# Patient Record
Sex: MALE | Race: WHITE
[De-identification: names, ages, dates, MRNs, and addresses within clinical notes are randomized per-mention and may not be internally consistent; named-entity substitution may affect disease eponyms.]

---

## 2018-10-22 ENCOUNTER — TRANSCRIPTION ENCOUNTER (OUTPATIENT)
Age: 63
End: 2018-10-22

## 2018-11-01 ENCOUNTER — TRANSCRIPTION ENCOUNTER (OUTPATIENT)
Age: 63
End: 2018-11-01

## 2019-02-07 ENCOUNTER — APPOINTMENT (OUTPATIENT)
Dept: OTOLARYNGOLOGY | Facility: CLINIC | Age: 64
End: 2019-02-07
Payer: COMMERCIAL

## 2019-02-07 VITALS
HEART RATE: 88 BPM | HEIGHT: 66 IN | SYSTOLIC BLOOD PRESSURE: 140 MMHG | BODY MASS INDEX: 21.05 KG/M2 | DIASTOLIC BLOOD PRESSURE: 96 MMHG | WEIGHT: 131 LBS

## 2019-02-07 PROCEDURE — G0268 REMOVAL OF IMPACTED WAX MD: CPT

## 2019-02-07 PROCEDURE — 92567 TYMPANOMETRY: CPT

## 2019-02-07 PROCEDURE — 92557 COMPREHENSIVE HEARING TEST: CPT

## 2019-02-07 PROCEDURE — 99204 OFFICE O/P NEW MOD 45 MIN: CPT | Mod: 25

## 2019-02-07 RX ORDER — CEFUROXIME AXETIL 500 MG/1
500 TABLET ORAL
Qty: 14 | Refills: 0 | Status: ACTIVE | COMMUNITY
Start: 2018-11-01

## 2019-02-07 RX ORDER — VEDOLIZUMAB 300 MG/5ML
300 INJECTION, POWDER, LYOPHILIZED, FOR SOLUTION INTRAVENOUS
Refills: 0 | Status: ACTIVE | COMMUNITY

## 2019-02-07 NOTE — HISTORY OF PRESENT ILLNESS
[de-identified] : Ptient has been having some itching in both ears that is minor and occasional. he has some ringing in both ears as well that comes and goes on occasion but does not keep him awake at night. He thinks the right ear is worse than the left ear. He is not having any nasal congestion or runny nose. His wife tells him that he does not hear well but hes not sure if he has hearing loss

## 2019-02-07 NOTE — ASSESSMENT
[FreeTextEntry1] : Patient follows up diminished hearing mass of cerumen impaction right ear a very narrow ear canal left ear had a moderate amount curetted out audiogram performed confirmed symmetrical bilateral high-frequency hearing loss relatively normal hearing in his speech frequencies I recommend he followup on an annual basis.

## 2019-02-07 NOTE — CONSULT LETTER
[Dear  ___] : Dear  [unfilled], [Consult Letter:] : I had the pleasure of evaluating your patient, [unfilled]. [Please see my note below.] : Please see my note below. [Consult Closing:] : Thank you very much for allowing me to participate in the care of this patient.  If you have any questions, please do not hesitate to contact me. [Sincerely,] : Sincerely, [FreeTextEntry3] : Kirill Champion MD\par [facility]\par Otolaryngology and Facial Plastics\par [employer], [employer]\par

## 2020-01-08 ENCOUNTER — TRANSCRIPTION ENCOUNTER (OUTPATIENT)
Age: 65
End: 2020-01-08

## 2020-09-01 ENCOUNTER — APPOINTMENT (OUTPATIENT)
Dept: OTOLARYNGOLOGY | Facility: CLINIC | Age: 65
End: 2020-09-01

## 2020-11-02 ENCOUNTER — APPOINTMENT (OUTPATIENT)
Dept: OTOLARYNGOLOGY | Facility: CLINIC | Age: 65
End: 2020-11-02
Payer: MEDICARE

## 2020-11-02 VITALS
HEART RATE: 80 BPM | BODY MASS INDEX: 21.05 KG/M2 | DIASTOLIC BLOOD PRESSURE: 94 MMHG | HEIGHT: 66 IN | SYSTOLIC BLOOD PRESSURE: 145 MMHG | TEMPERATURE: 97.3 F | WEIGHT: 131 LBS

## 2020-11-02 DIAGNOSIS — H61.23 IMPACTED CERUMEN, BILATERAL: ICD-10-CM

## 2020-11-02 DIAGNOSIS — H90.3 SENSORINEURAL HEARING LOSS, BILATERAL: ICD-10-CM

## 2020-11-02 PROCEDURE — 99214 OFFICE O/P EST MOD 30 MIN: CPT | Mod: 25

## 2020-11-02 PROCEDURE — 31231 NASAL ENDOSCOPY DX: CPT

## 2020-11-02 PROCEDURE — 99072 ADDL SUPL MATRL&STAF TM PHE: CPT

## 2020-11-02 NOTE — ASSESSMENT
[FreeTextEntry1] : Patient for cerumen checkup no wax either way he does have a lot of postnasal drip endoscopically severe deviated septum on his left side he will use some Flonase to see if that will help him otherwise no further acute interventions indicated and he will follow-up with us annually.

## 2020-11-02 NOTE — HISTORY OF PRESENT ILLNESS
[de-identified] : PAtient states that he has had minor clogging\par He denies changes in hearing but still has baseline ringing in both ears that comes and goes.He does not have any dizziness or vertigo. \par PAtient has not had any issues breathing through his nose recently but his sense of smell has been diminished and is getting progressively worse. He thinks he started to lose his sense of smell over a year ago but it has been getting worse. When he blows his nose his ears pop. He does not get any sinus pressure but in the morning he has a cough and blows out mucus from the nose.

## 2020-11-02 NOTE — REVIEW OF SYSTEMS
[Hearing Loss] : hearing loss [Ear Noises] : ear noises [Nasal Congestion] : nasal congestion [Negative] : Heme/Lymph

## 2021-02-08 ENCOUNTER — RX RENEWAL (OUTPATIENT)
Age: 66
End: 2021-02-08

## 2021-03-12 ENCOUNTER — RX RENEWAL (OUTPATIENT)
Age: 66
End: 2021-03-12

## 2021-03-12 RX ORDER — FLUTICASONE PROPIONATE 50 UG/1
50 SPRAY, METERED NASAL DAILY
Qty: 16 | Refills: 0 | Status: ACTIVE | COMMUNITY
Start: 2020-11-02 | End: 1900-01-01

## 2021-04-09 ENCOUNTER — RX RENEWAL (OUTPATIENT)
Age: 66
End: 2021-04-09

## 2023-01-20 ENCOUNTER — APPOINTMENT (OUTPATIENT)
Dept: OTOLARYNGOLOGY | Facility: CLINIC | Age: 68
End: 2023-01-20
Payer: COMMERCIAL

## 2023-01-20 VITALS
SYSTOLIC BLOOD PRESSURE: 154 MMHG | TEMPERATURE: 97.9 F | HEIGHT: 65 IN | HEART RATE: 108 BPM | BODY MASS INDEX: 21.66 KG/M2 | DIASTOLIC BLOOD PRESSURE: 104 MMHG | WEIGHT: 130 LBS

## 2023-01-20 DIAGNOSIS — R09.82 POSTNASAL DRIP: ICD-10-CM

## 2023-01-20 DIAGNOSIS — J34.2 DEVIATED NASAL SEPTUM: ICD-10-CM

## 2023-01-20 PROCEDURE — 99214 OFFICE O/P EST MOD 30 MIN: CPT | Mod: 25

## 2023-01-20 PROCEDURE — 31231 NASAL ENDOSCOPY DX: CPT

## 2023-01-20 RX ORDER — AMOXICILLIN AND CLAVULANATE POTASSIUM 600; 42.9 MG/5ML; MG/5ML
600-42.9 FOR SUSPENSION ORAL
Qty: 150 | Refills: 0 | Status: ACTIVE | COMMUNITY
Start: 2023-01-20 | End: 1900-01-01

## 2023-01-20 NOTE — PROCEDURE
[FreeTextEntry6] : Flexible scope #38 was used. Right nasal passage with normal inferior, middle and superior turbinates. Nasal passage patent with clear middle meatus and sphenoethmoid recess. Left nasal passage with normal inferior, middle and superior turbinates. Nasal passage was patent with blood and purulence from middle meatus and sphenoethmoid recess. No masses or polyps appreciated. Nasopharynx with dried blood. Oropharynx/Hypopharynx stained with dried blood.\par

## 2023-01-20 NOTE — ASSESSMENT
[FreeTextEntry1] : Mr. WARNER 67 year M complains of intermittent nose bleeds L side when blowing his nose since Wednesday. Currently getting dental work done. Has a chrons stricture. On exam he has Bleeding and purulent drainage from L middle meatus; suspect left maxillary sinusitis:\par \par Plan:\par - Rx: Augmentin and Flonase \par -Advised to use Neosonephrine for 3 days only, 1 spray to the L side ONLY, followed by sinus rinse and Flonase\par - doubt odontogenic since no significant max teeth on left although he does have an implant\par - f/u 2-3 weeks to recheck and ensure resolved; may get CT max face with contrast if persists to r/o underlying lesion \par - f/u 2 weeks

## 2023-01-20 NOTE — HISTORY OF PRESENT ILLNESS
[de-identified] : PAtient complains he had intermittent nose bleeds L side when blowing his nose. Had one episode Wednesday night, Thursday Morning and this morning. Only happening when he blows his nose. Has a constant PND not using any nasal sprays. States he wasn’t able to smell well for a year after the nose bleed he started smelling.  Denies sinus pain/pressure, facial numbness/tingling, fever, chills.  Currently getting dental work done \par Has a Chrons Stricture

## 2023-01-20 NOTE — END OF VISIT
[FreeTextEntry3] : I personally saw and examined . ELIANA WARNER  in detail this visit today. I personally reviewed the HPI, PMH, FH, SH, ROS and medications/allergies. I have spoken to MAEGAN Gay regarding the history and have personally determined the assessment and plan of care, and documented this myself. I was present and participated in all key portions of the encounter and all procedures noted above. I have made changes in the body of the note where appropriate.\par \par Attesting Faculty: See Attending Signature Below\par \par

## 2023-01-20 NOTE — PHYSICAL EXAM
[de-identified] : Bleeding and purulent drainage from L Maxillary  [Normal] : mucosa is normal [Midline] : trachea located in midline position [de-identified] : left maxillary essentially edentulous, left lateral incisor with implant

## 2023-02-10 ENCOUNTER — APPOINTMENT (OUTPATIENT)
Dept: OTOLARYNGOLOGY | Facility: CLINIC | Age: 68
End: 2023-02-10
Payer: COMMERCIAL

## 2023-02-10 VITALS
SYSTOLIC BLOOD PRESSURE: 132 MMHG | WEIGHT: 130 LBS | BODY MASS INDEX: 21.66 KG/M2 | HEART RATE: 89 BPM | HEIGHT: 65 IN | TEMPERATURE: 97.7 F | DIASTOLIC BLOOD PRESSURE: 83 MMHG

## 2023-02-10 DIAGNOSIS — J32.0 CHRONIC MAXILLARY SINUSITIS: ICD-10-CM

## 2023-02-10 PROCEDURE — 31231 NASAL ENDOSCOPY DX: CPT

## 2023-02-10 PROCEDURE — 99213 OFFICE O/P EST LOW 20 MIN: CPT | Mod: 25

## 2023-02-10 NOTE — ASSESSMENT
[FreeTextEntry1] : Infection appears resolve, however, continues to have some mucus in NP.  immunosuppressed on Humira. \par - Continue Flonase and sinus wash\par - F/U 2 months,. sooner if getting any return of symptoms. 
- - -

## 2023-02-10 NOTE — PROCEDURE
[FreeTextEntry6] : Flexible scope #2 was used. Right nasal passage with normal inferior, middle and superior turbinates. Nasal passage patent with clear middle meatus and sphenoethmoid recess. Left nasal passage with normal inferior, middle and superior turbinates. Nasal passage was patent with clear middle meatus and sphenoethmoid recess. Right with thick mucus.  No or polyps appreciated. Nasopharynx clear.\par \par

## 2023-02-10 NOTE — HISTORY OF PRESENT ILLNESS
[de-identified] : 68 y/o M at last visit had Left maxillary sinusitis treated with Flonase and Augmentin.  Notes sinus pressure and congestion have resolved.  No longer getting blood in the tissue with nose blowing.  Sense of smell is much improved. \par Using Sinus wash\par On Humira.

## 2023-04-21 ENCOUNTER — APPOINTMENT (OUTPATIENT)
Dept: OTOLARYNGOLOGY | Facility: CLINIC | Age: 68
End: 2023-04-21

## 2023-07-17 ENCOUNTER — RX RENEWAL (OUTPATIENT)
Age: 68
End: 2023-07-17

## 2023-08-14 ENCOUNTER — RX RENEWAL (OUTPATIENT)
Age: 68
End: 2023-08-14

## 2023-08-14 RX ORDER — FLUTICASONE PROPIONATE 50 UG/1
50 SPRAY, METERED NASAL DAILY
Qty: 2 | Refills: 5 | Status: ACTIVE | COMMUNITY
Start: 2023-01-20 | End: 1900-01-01

## 2023-08-31 ENCOUNTER — APPOINTMENT (OUTPATIENT)
Age: 68
End: 2023-08-31
Payer: COMMERCIAL

## 2023-08-31 PROCEDURE — PIP: CUSTOM

## 2023-10-13 ENCOUNTER — APPOINTMENT (OUTPATIENT)
Age: 68
End: 2023-10-13
Payer: SELF-PAY

## 2023-10-13 PROCEDURE — D5865: CPT

## 2023-10-30 ENCOUNTER — APPOINTMENT (OUTPATIENT)
Age: 68
End: 2023-10-30
Payer: COMMERCIAL

## 2023-10-30 ENCOUNTER — APPOINTMENT (OUTPATIENT)
Age: 68
End: 2023-10-30

## 2023-10-30 PROCEDURE — CUD2: CUSTOM

## 2023-10-30 PROCEDURE — CLD3: CUSTOM

## 2023-11-20 ENCOUNTER — APPOINTMENT (OUTPATIENT)
Age: 68
End: 2023-11-20
Payer: SELF-PAY

## 2023-11-20 PROCEDURE — CUD3: CUSTOM

## 2023-11-20 PROCEDURE — CLD3: CUSTOM

## 2023-12-11 ENCOUNTER — APPOINTMENT (OUTPATIENT)
Age: 68
End: 2023-12-11
Payer: COMMERCIAL

## 2023-12-11 PROCEDURE — CUD4: CUSTOM

## 2024-01-22 ENCOUNTER — APPOINTMENT (OUTPATIENT)
Age: 69
End: 2024-01-22
Payer: COMMERCIAL

## 2024-01-22 PROCEDURE — CUD4: CUSTOM

## 2024-03-04 ENCOUNTER — APPOINTMENT (OUTPATIENT)
Age: 69
End: 2024-03-04
Payer: COMMERCIAL

## 2024-03-04 PROCEDURE — XXXXX: CPT | Mod: 1L

## 2024-04-25 ENCOUNTER — APPOINTMENT (OUTPATIENT)
Age: 69
End: 2024-04-25
Payer: COMMERCIAL

## 2024-04-25 PROCEDURE — ZZZZZ: CPT

## 2024-05-13 ENCOUNTER — APPOINTMENT (OUTPATIENT)
Age: 69
End: 2024-05-13
Payer: COMMERCIAL

## 2024-05-13 PROCEDURE — 99024 POSTOP FOLLOW-UP VISIT: CPT

## 2024-05-29 ENCOUNTER — APPOINTMENT (OUTPATIENT)
Age: 69
End: 2024-05-29
Payer: COMMERCIAL

## 2024-05-29 PROCEDURE — 99024 POSTOP FOLLOW-UP VISIT: CPT

## 2024-07-02 ENCOUNTER — APPOINTMENT (OUTPATIENT)
Age: 69
End: 2024-07-02
Payer: COMMERCIAL

## 2024-07-02 PROCEDURE — PIP: CUSTOM

## 2024-07-30 ENCOUNTER — APPOINTMENT (OUTPATIENT)
Age: 69
End: 2024-07-30
Payer: COMMERCIAL

## 2024-07-30 PROCEDURE — D6011: CPT | Mod: NC

## 2024-08-06 ENCOUNTER — APPOINTMENT (OUTPATIENT)
Age: 69
End: 2024-08-06

## 2024-08-06 PROCEDURE — D6011: CPT | Mod: NC

## 2024-08-20 ENCOUNTER — APPOINTMENT (OUTPATIENT)
Age: 69
End: 2024-08-20
Payer: COMMERCIAL

## 2024-08-20 PROCEDURE — PIP: CUSTOM

## 2024-08-27 ENCOUNTER — APPOINTMENT (OUTPATIENT)
Age: 69
End: 2024-08-27
Payer: COMMERCIAL

## 2024-08-27 PROCEDURE — CLD5: CUSTOM

## 2024-08-27 PROCEDURE — CUD5: CUSTOM

## 2024-08-29 ENCOUNTER — APPOINTMENT (OUTPATIENT)
Age: 69
End: 2024-08-29
Payer: COMMERCIAL

## 2024-08-29 PROCEDURE — CLD6: CUSTOM

## 2024-09-03 ENCOUNTER — APPOINTMENT (OUTPATIENT)
Age: 69
End: 2024-09-03
Payer: COMMERCIAL

## 2024-09-03 PROCEDURE — CLD6: CUSTOM

## 2024-09-13 ENCOUNTER — APPOINTMENT (OUTPATIENT)
Age: 69
End: 2024-09-13
Payer: COMMERCIAL

## 2024-09-13 PROCEDURE — D0171: CPT | Mod: NC
